# Patient Record
Sex: FEMALE | Race: ASIAN | Employment: FULL TIME | URBAN - METROPOLITAN AREA
[De-identification: names, ages, dates, MRNs, and addresses within clinical notes are randomized per-mention and may not be internally consistent; named-entity substitution may affect disease eponyms.]

---

## 2017-09-21 ENCOUNTER — TELEPHONE (OUTPATIENT)
Dept: INTERNAL MEDICINE CLINIC | Facility: CLINIC | Age: 42
End: 2017-09-21

## 2017-09-21 NOTE — TELEPHONE ENCOUNTER
Patient fell while she was in [place] ([facility]) and needed stitches on her jaw. She needs the stitches removed. Please advise.

## 2017-09-22 ENCOUNTER — OFFICE VISIT (OUTPATIENT)
Dept: INTERNAL MEDICINE CLINIC | Facility: CLINIC | Age: 42
End: 2017-09-22

## 2017-09-22 VITALS
BODY MASS INDEX: 21.79 KG/M2 | TEMPERATURE: 99 F | WEIGHT: 123 LBS | HEART RATE: 68 BPM | HEIGHT: 63 IN | SYSTOLIC BLOOD PRESSURE: 96 MMHG | DIASTOLIC BLOOD PRESSURE: 52 MMHG | RESPIRATION RATE: 18 BRPM

## 2017-09-22 DIAGNOSIS — R22.0 LIP SWELLING: ICD-10-CM

## 2017-09-22 DIAGNOSIS — Z48.02 VISIT FOR SUTURE REMOVAL: Primary | ICD-10-CM

## 2017-09-22 DIAGNOSIS — R55 SYNCOPE, UNSPECIFIED SYNCOPE TYPE: ICD-10-CM

## 2017-09-22 DIAGNOSIS — S01.512D: ICD-10-CM

## 2017-09-22 DIAGNOSIS — S01.511D LIP LACERATION, SUBSEQUENT ENCOUNTER: ICD-10-CM

## 2017-09-22 DIAGNOSIS — N93.9 EPISODE OF HEAVY VAGINAL BLEEDING: ICD-10-CM

## 2017-09-22 PROCEDURE — 99214 OFFICE O/P EST MOD 30 MIN: CPT | Performed by: NURSE PRACTITIONER

## 2017-09-22 RX ORDER — NAPROXEN 500 MG/1
500 TABLET ORAL 2 TIMES DAILY WITH MEALS
Qty: 10 TABLET | Refills: 0 | Status: SHIPPED | OUTPATIENT
Start: 2017-09-22 | End: 2017-10-18 | Stop reason: ALTCHOICE

## 2017-09-22 RX ORDER — AMOXICILLIN 500 MG/1
500 CAPSULE ORAL 3 TIMES DAILY
Qty: 30 CAPSULE | Refills: 0 | Status: SHIPPED | OUTPATIENT
Start: 2017-09-22 | End: 2017-10-02

## 2017-09-22 NOTE — PROGRESS NOTES
Patient presents with:  Suture Removal: from pt falling in her right side of the face by the chin. HPI:  Presents for suture removal to right lower lip form injury that occurred after fall resulting from a syncopal episode while in [facility]) on business. 96/52   Pulse 68   Temp 98.9 °F (37.2 °C) (Oral)   Resp 18   Ht 63\"   Wt 123 lb   LMP 09/18/2017 (Approximate)   Breastfeeding? No   BMI 21.79 kg/m²   Constitutional:  No distress. HEENT: Normocephalic. Tympanic membranes clear bilat.  Oropharynx is pink BID-TID. RTO in 1 week for re-eval.     Lip swelling- Naproxen BID for 5 days. RTO in 1 week for re-eval.     Syncope, unspecified syncope type- Refused work up. Discussed need for full evaluation even though likely cause is heavy vaginal bleeding.  Patient

## 2017-10-18 ENCOUNTER — OFFICE VISIT (OUTPATIENT)
Dept: INTERNAL MEDICINE CLINIC | Facility: CLINIC | Age: 42
End: 2017-10-18

## 2017-10-18 VITALS
DIASTOLIC BLOOD PRESSURE: 60 MMHG | BODY MASS INDEX: 19.84 KG/M2 | SYSTOLIC BLOOD PRESSURE: 100 MMHG | OXYGEN SATURATION: 100 % | WEIGHT: 112 LBS | HEIGHT: 63 IN | HEART RATE: 55 BPM

## 2017-10-18 DIAGNOSIS — N92.0 MENORRHAGIA WITH REGULAR CYCLE: ICD-10-CM

## 2017-10-18 DIAGNOSIS — D25.0 SUBMUCOUS LEIOMYOMA OF UTERUS: ICD-10-CM

## 2017-10-18 DIAGNOSIS — D50.0 IRON DEFICIENCY ANEMIA DUE TO CHRONIC BLOOD LOSS: Primary | ICD-10-CM

## 2017-10-18 PROCEDURE — 99214 OFFICE O/P EST MOD 30 MIN: CPT | Performed by: INTERNAL MEDICINE

## 2017-10-18 NOTE — PROGRESS NOTES
Live Paulson is a 40year old female.   HPI:   CC:   anemia  Started iron pills last 1 week two tablets twice a day  No GI upset   Pt has heavy period first time ever  Feels sob, heart beating, weak and fatigued now ok  Pt took 4 1/2 days off last week  Wo kg/m²   GENERAL: well developed, well nourished,in no apparent distress  SKIN: no rashes/lesions/ulcers  HEENT: sclera and conjunctiva not injected slight pale conjunctiva , TM intact no erythema, oropharynx clear, posterior pharynx without erythema, clear

## 2018-01-03 ENCOUNTER — OFFICE VISIT (OUTPATIENT)
Dept: INTERNAL MEDICINE CLINIC | Facility: CLINIC | Age: 43
End: 2018-01-03

## 2018-01-03 VITALS
TEMPERATURE: 98 F | BODY MASS INDEX: 20.49 KG/M2 | RESPIRATION RATE: 12 BRPM | WEIGHT: 115.63 LBS | DIASTOLIC BLOOD PRESSURE: 72 MMHG | SYSTOLIC BLOOD PRESSURE: 126 MMHG | HEIGHT: 63 IN | HEART RATE: 54 BPM

## 2018-01-03 DIAGNOSIS — Z00.00 ROUTINE PHYSICAL EXAMINATION: Primary | ICD-10-CM

## 2018-01-03 PROCEDURE — 99396 PREV VISIT EST AGE 40-64: CPT | Performed by: INTERNAL MEDICINE

## 2018-01-03 NOTE — PROGRESS NOTES
HPI:   Mago Grimaldo is a 40year old female who presents for a complete physical exam.    Wt Readings from Last 6 Encounters:  01/03/18 : 115 lb 9.6 oz  11/28/17 : 115 lb  10/18/17 : 112 lb  09/22/17 : 123 lb  04/30/15 : 124 lb    Body mass index is 20.48 occasion  HEMATOLOGIC: + hx of anemia  ENDOCRINE: no diabetes or thyroid   ALL/ASTHMA: denies hx of allergy or asthma    EXAM:   /72 (BP Location: Left arm, Patient Position: Sitting, Cuff Size: adult)   Pulse 54   Temp 97.8 °F (36.6 °C) (Oral)   Res

## 2019-06-12 ENCOUNTER — OFFICE VISIT (OUTPATIENT)
Dept: FAMILY MEDICINE CLINIC | Facility: CLINIC | Age: 44
End: 2019-06-12
Payer: COMMERCIAL

## 2019-06-12 VITALS
TEMPERATURE: 99 F | RESPIRATION RATE: 14 BRPM | DIASTOLIC BLOOD PRESSURE: 60 MMHG | HEIGHT: 63 IN | HEART RATE: 52 BPM | BODY MASS INDEX: 21.62 KG/M2 | WEIGHT: 122 LBS | SYSTOLIC BLOOD PRESSURE: 110 MMHG

## 2019-06-12 DIAGNOSIS — L72.9 SKIN CYST: Primary | ICD-10-CM

## 2019-06-12 PROCEDURE — 99203 OFFICE O/P NEW LOW 30 MIN: CPT | Performed by: NURSE PRACTITIONER

## 2019-06-12 NOTE — PROGRESS NOTES
Raven Patel is a 38year old female. HPI:   Patient presents today as a new patient to establish care- was previously seeing Dr. Bryan Gan. Patient reporting a lump to her right armpit that has been present x 1 day. She denies any pain.  She denies any drainag requested or ordered in this encounter       Imaging & Consults:  None    Follow Up with:  No follow-up provider specified. 1. Skin cyst  Discussed cysts with the patient. Warm compresses as needed. No s/s of infection at this time. Monitor.     The

## 2019-06-13 ENCOUNTER — TELEPHONE (OUTPATIENT)
Dept: FAMILY MEDICINE CLINIC | Facility: CLINIC | Age: 44
End: 2019-06-13

## 2019-06-13 NOTE — TELEPHONE ENCOUNTER
Patient signed medical records request form in office to request records from [facility] with Dr. Chago Romero office. Release faxed to [phone] on 6/13/19. Release sent to patients chart.

## 2019-07-18 NOTE — TELEPHONE ENCOUNTER
Request returned, sent to incorrect fax number. Called [facility] and re-faxed to [phone] on 7/18/19 per Dr. Gila Herman office.

## 2021-11-05 ENCOUNTER — APPOINTMENT (OUTPATIENT)
Dept: BEHAVIORAL HEALTH | Age: 46
End: 2021-11-05

## 2021-12-21 ENCOUNTER — BEHAVIORAL HEALTH (OUTPATIENT)
Dept: BEHAVIORAL HEALTH | Age: 46
End: 2021-12-21

## 2021-12-21 DIAGNOSIS — F33.1 MDD (MAJOR DEPRESSIVE DISORDER), RECURRENT EPISODE, MODERATE (CMD): Primary | ICD-10-CM

## 2021-12-21 DIAGNOSIS — F41.1 GAD (GENERALIZED ANXIETY DISORDER): ICD-10-CM

## 2021-12-21 PROBLEM — G47.00 INSOMNIA: Status: ACTIVE | Noted: 2021-12-21

## 2021-12-21 PROCEDURE — 90792 PSYCH DIAG EVAL W/MED SRVCS: CPT | Performed by: PSYCHIATRY & NEUROLOGY

## 2022-01-27 ENCOUNTER — APPOINTMENT (OUTPATIENT)
Dept: BEHAVIORAL HEALTH | Age: 47
End: 2022-01-27

## 2022-02-10 ENCOUNTER — BEHAVIORAL HEALTH (OUTPATIENT)
Dept: BEHAVIORAL HEALTH | Age: 47
End: 2022-02-10

## 2022-02-10 DIAGNOSIS — F33.1 MDD (MAJOR DEPRESSIVE DISORDER), RECURRENT EPISODE, MODERATE (CMD): Primary | ICD-10-CM

## 2022-02-10 DIAGNOSIS — F41.1 GAD (GENERALIZED ANXIETY DISORDER): ICD-10-CM

## 2022-02-10 DIAGNOSIS — G47.00 INSOMNIA, UNSPECIFIED TYPE: ICD-10-CM

## 2022-02-10 PROCEDURE — 99214 OFFICE O/P EST MOD 30 MIN: CPT | Performed by: PSYCHIATRY & NEUROLOGY

## 2022-02-10 RX ORDER — MIRTAZAPINE 15 MG/1
15 TABLET, FILM COATED ORAL NIGHTLY
Qty: 30 TABLET | Refills: 0 | Status: SHIPPED | OUTPATIENT
Start: 2022-02-10 | End: 2022-03-12

## 2022-03-10 ENCOUNTER — APPOINTMENT (OUTPATIENT)
Dept: BEHAVIORAL HEALTH | Age: 47
End: 2022-03-10

## 2022-03-24 ENCOUNTER — APPOINTMENT (OUTPATIENT)
Dept: BEHAVIORAL HEALTH | Age: 47
End: 2022-03-24

## 2022-04-14 ENCOUNTER — APPOINTMENT (OUTPATIENT)
Dept: BEHAVIORAL HEALTH | Age: 47
End: 2022-04-14

## 2022-05-05 ENCOUNTER — APPOINTMENT (OUTPATIENT)
Dept: BEHAVIORAL HEALTH | Age: 47
End: 2022-05-05

## 2022-07-01 ENCOUNTER — TELEPHONE (OUTPATIENT)
Dept: FAMILY MEDICINE CLINIC | Facility: CLINIC | Age: 47
End: 2022-07-01

## 2022-07-01 NOTE — TELEPHONE ENCOUNTER
LM to call. Last seen by Jesse Bowden in 2019. Asked to call and specify if she is staying with [facility], if so change PCP to .  if not book appointment with

## (undated) NOTE — LETTER
02/02/18        Vinay Olmstead  [address]    1/22/1975    Dear Mickie Arteaga,    Our records indicate that you have outstanding lab work and or testing that was ordered for you and has not yet been completed:        ENRIQUETA Webb